# Patient Record
Sex: FEMALE | Race: BLACK OR AFRICAN AMERICAN | URBAN - METROPOLITAN AREA
[De-identification: names, ages, dates, MRNs, and addresses within clinical notes are randomized per-mention and may not be internally consistent; named-entity substitution may affect disease eponyms.]

---

## 2020-01-30 ENCOUNTER — HISTORICAL (OUTPATIENT)
Dept: ADMINISTRATIVE | Facility: HOSPITAL | Age: 30
End: 2020-01-30

## 2022-04-10 ENCOUNTER — HISTORICAL (OUTPATIENT)
Dept: ADMINISTRATIVE | Facility: HOSPITAL | Age: 32
End: 2022-04-10

## 2022-04-27 VITALS
SYSTOLIC BLOOD PRESSURE: 115 MMHG | BODY MASS INDEX: 18.51 KG/M2 | HEIGHT: 67 IN | WEIGHT: 117.94 LBS | OXYGEN SATURATION: 100 % | DIASTOLIC BLOOD PRESSURE: 61 MMHG

## 2022-05-04 NOTE — HISTORICAL [facility] CERNER
This is a historical note converted from Vel. Formatting and pictures may have been removed.  Please reference Vel for original formatting and attached multimedia. Chief Complaint  29-year-old -American female presents clinic with right knee pain since yesterday  History of Present Illness  29-year-old -American female presents to clinic complaining of right knee pain. ?Patient states yesterday she was?wrestling her son he was grabbing her leg and she twisted to?get away from him and heard and felt a pop. ?Now having pain with weightbearing and ambulation and a constant pain on the inside aspect of the knee. ?Patient is having difficulty extending and flexing the right knee.  Review of Systems  Constitutional: negative except as stated in HPI  Eye: negative except as stated in HPI  ENT: negative except as stated in HPI  Respiratory: negative except as stated in HPI  Cardiovascular: negative except as stated in HPI  Gastrointestinal: negative except as stated in HPI  Genitourinary: negative except as stated in HPI  Hema/Lymph: negative except as stated in HPI  Endocrine: negative except as stated in HPI  Immunologic: negative except as stated in HPI  Musculoskeletal: negative except as stated in HPI  Integumentary: negative except as stated in HPI  Neurologic: negative except as stated in HPI  All Other ROS_ negative except as stated in HPI  Physical Exam  Vitals & Measurements  T:?37.0? ?C (Oral)? HR:?55(Peripheral)? RR:?20? BP:?115/61? SpO2:?100%?  HT:?170?cm? WT:?53.6?kg? BMI:?18.55? LMP:?01/30/2020 00:00 CST?  General_well-developed well-nourished in no acute distress  Musculoskeletal_tenderness to palpation medial aspect of the right knee. ?Limited flexion and extension due to pain. ?Negative McMurrays. ?Negative Lockmans negative anterior posterior  Integumentary_no rashes or skin lesions present  Neurologic_ cranial nerves intact, no signs of peripheral neurological deficit, motor/sensory  function intact  ?  Assessment/Plan  1.?Right knee pain?M25.561  Do not take any Advil Aleve Motrin or ibuprofen with the anti-inflammatory prescribed.? Apply ice to the affected area 3-4 times a day for 10 to 15 minutes. ?Use the crutches to be nonweightbearing until you are?evaluated by orthopedics. ?They will call you for an appointment  Ordered:  diclofenac, 75 mg = 1 tab(s), Oral, BID, # 28 tab(s), 0 Refill(s), Pharmacy: [facility] #[id number], 170, cm, Height/Length Dosing, 01/30/20 10:28:00 CST, 53.6, kg, Weight Dosing, 01/30/20 10:28:00 CST  Crutches () PC, 01/30/20 10:43:00 CST, 01/30/20 10:43:00 CST, Right knee pain  Office/Outpatient Visit Level 3 Established 01864 PC, Right knee pain, 01/30/20 10:41:00 CST  XR Knee Right 1 or 2 Views, Stat, 01/30/20 10:41:00 CST, Pain, Right medial knee pain after wrestling with her son. Orange and felt a pop., None, Ambulatory, M25.561, Not Scheduled, 01/30/20 10:41:00 CST  ?  Referrals  [facility] Internal Referral to Orthopedics Clinic, Specialty: Sports Medicine, Reason: Right knee pain, Start: 01/30/20 11:06:00 CST   Problem List/Past Medical History  Ongoing  No chronic problems  Historical  No qualifying data  Procedure/Surgical History  none   Medications  diclofenac sodium 75 mg oral delayed release tablet, 75 mg= 1 tab(s), Oral, BID  Allergies  codeine?(swebrad)  Social History  Abuse/Neglect  No, 01/30/2020  Tobacco  Never (less than 100 in lifetime), N/A, Household tobacco concerns: No., 01/30/2020  Never smoker, N/A, 10/20/2018  Family History  Congestive heart disease.: Mother.  Diabetes mellitus type 1.: Mother.  Hypertension.: Mother.  Father: History is unknown  Health Maintenance  Health Maintenance  ???Pending?(in the next year)  ??? ??Due?  ??? ? ? ?Alcohol Misuse Screening due??01/01/20??and every 1??year(s)  ??? ? ? ?ADL Screening due??01/30/20??and every 1??year(s)  ??? ? ? ?Tetanus Vaccine due??01/30/20??and every 10??year(s)  ??? ??Due In  Future?  ??? ? ? ?Obesity Screening not due until??01/01/21??and every 1??year(s)  ???Satisfied?(in the past 1 year)  ??? ??Satisfied?  ??? ? ? ?Blood Pressure Screening on??01/30/20.??Satisfied by Lisa Morfin LPN  ??? ? ? ?Body Mass Index Check on??01/30/20.??Satisfied by Lisa Morfin LPN  ??? ? ? ?Influenza Vaccine on??01/30/20.??Satisfied by Lisa Morfin LPN  ??? ? ? ?Obesity Screening on??01/30/20.??Satisfied by Lisa Morfin LPN  ?  Diagnostic Results  Negative x-ray preliminary read